# Patient Record
Sex: MALE | Race: WHITE
[De-identification: names, ages, dates, MRNs, and addresses within clinical notes are randomized per-mention and may not be internally consistent; named-entity substitution may affect disease eponyms.]

---

## 2017-07-25 ENCOUNTER — HOSPITAL ENCOUNTER (OUTPATIENT)
Dept: HOSPITAL 53 - M LAB REF | Age: 47
End: 2017-07-25
Attending: INTERNAL MEDICINE
Payer: MEDICARE

## 2017-07-25 DIAGNOSIS — R80.1: Primary | ICD-10-CM

## 2018-07-26 ENCOUNTER — HOSPITAL ENCOUNTER (OUTPATIENT)
Dept: HOSPITAL 53 - M LAB REF | Age: 48
End: 2018-07-26
Attending: INTERNAL MEDICINE
Payer: MEDICARE

## 2018-07-26 DIAGNOSIS — R80.1: Primary | ICD-10-CM

## 2018-07-26 LAB
CREATININE,RANDOM URINE: 124 MG/DL
TOTAL PROTEIN,RANDOM URINE: 16.7 MG/DL (ref 0–12)

## 2018-07-26 PROCEDURE — 82570 ASSAY OF URINE CREATININE: CPT

## 2020-04-29 ENCOUNTER — HOSPITAL ENCOUNTER (OUTPATIENT)
Dept: HOSPITAL 53 - M LABSMTC | Age: 50
End: 2020-04-29
Attending: FAMILY MEDICINE
Payer: MEDICARE

## 2020-04-29 DIAGNOSIS — Z20.828: ICD-10-CM

## 2020-04-29 DIAGNOSIS — Z11.59: Primary | ICD-10-CM

## 2020-10-07 ENCOUNTER — HOSPITAL ENCOUNTER (OUTPATIENT)
Dept: HOSPITAL 53 - M RAD | Age: 50
End: 2020-10-07
Attending: PHYSICIAN ASSISTANT
Payer: MEDICARE

## 2020-10-07 DIAGNOSIS — M54.5: Primary | ICD-10-CM

## 2020-10-07 NOTE — REPVR
PROCEDURE INFORMATION: 

Exam: MR Lumbar Spine Without Contrast. 

Exam date and time: 10/7/2020 1:36 PM 

Age: 49 years old 

Clinical indication: Low back pain; Patient HX: Lbp 



TECHNIQUE: 

Imaging protocol: Multiplanar magnetic resonance images of the lumbar spine 

without intravenous contrast. 



COMPARISON: 

No relevant prior studies available. 



FINDINGS: 

Vertebrae:  There is mild ventral wedging of the L1 body without edema to 

suggest acuity.  Normal vertebral body heights are otherwise preserved.

Spinal cord: Normal signal. No cord compression.  There is a thin lipoma of the 

filum terminalis.

L1-L2:  There is shallow disc bulging.  There is mild facet hypertrophy.  The 

spinal canal and neural foramina are patent. 

L2-L3:  There is shallow disc bulging.  There is mild facet hypertrophy.  There 

is mild bilateral neural foraminal narrowing.

L3-L4:  There is shallow disc bulging.  There is mild-to-moderate facet 

hypertrophy.  There is mild bilateral neural foraminal narrowing. 

L4-L5:  There is shallow disc bulging.  There is moderate facet hypertrophy.  

The spinal canal and neural foramina are patent.

L5-S1:  There is diffuse disc bulging.  There is mild-to-moderate facet 

hypertrophy.  The spinal canal and neural foramina are patent. 



Soft tissues: Unremarkable. 



IMPRESSION: 

Mild degenerative disc disease and spondylosis.  Changes contribute to 

multilevel mild neural foraminal narrowing.



Electronically signed by: Lore Rodríguez On 10/07/2020  13:54:16 PM

## 2021-11-17 ENCOUNTER — HOSPITAL ENCOUNTER (OUTPATIENT)
Dept: HOSPITAL 53 - M LAB REF | Age: 51
End: 2021-11-17
Attending: INTERNAL MEDICINE
Payer: MEDICARE

## 2021-11-17 DIAGNOSIS — E83.42: Primary | ICD-10-CM
